# Patient Record
Sex: FEMALE | Race: WHITE
[De-identification: names, ages, dates, MRNs, and addresses within clinical notes are randomized per-mention and may not be internally consistent; named-entity substitution may affect disease eponyms.]

---

## 2019-09-24 ENCOUNTER — HOSPITAL ENCOUNTER (OUTPATIENT)
Dept: HOSPITAL 84 - D.CATH | Age: 74
Discharge: HOME | End: 2019-09-24
Attending: INTERNAL MEDICINE
Payer: MEDICARE

## 2019-09-24 VITALS — SYSTOLIC BLOOD PRESSURE: 168 MMHG | DIASTOLIC BLOOD PRESSURE: 72 MMHG

## 2019-09-24 VITALS — BODY MASS INDEX: 41.01 KG/M2 | WEIGHT: 270.57 LBS | HEIGHT: 68 IN | BODY MASS INDEX: 41.01 KG/M2

## 2019-09-24 DIAGNOSIS — I25.119: Primary | ICD-10-CM

## 2019-09-24 DIAGNOSIS — R94.30: ICD-10-CM

## 2019-09-24 LAB
ALT SERPL-CCNC: 24 U/L (ref 10–68)
ANION GAP SERPL CALC-SCNC: 16.8 MMOL/L (ref 8–16)
BASOPHILS NFR BLD AUTO: 0.3 % (ref 0–2)
BUN SERPL-MCNC: 24 MG/DL (ref 7–18)
CALCIUM SERPL-MCNC: 9.3 MG/DL (ref 8.5–10.1)
CHLORIDE SERPL-SCNC: 109 MMOL/L (ref 98–107)
CHOLEST/HDLC SERPL: 4.7 RATIO (ref 2.3–4.1)
CO2 SERPL-SCNC: 20.7 MMOL/L (ref 21–32)
CREAT SERPL-MCNC: 0.8 MG/DL (ref 0.6–1.3)
EOSINOPHIL NFR BLD: 3.1 % (ref 0–7)
ERYTHROCYTE [DISTWIDTH] IN BLOOD BY AUTOMATED COUNT: 14.6 % (ref 11.5–14.5)
GLUCOSE SERPL-MCNC: 117 MG/DL (ref 74–106)
HCT VFR BLD CALC: 41 % (ref 36–48)
HDLC SERPL-MCNC: 48 MG/DL (ref 32–96)
HGB BLD-MCNC: 13.7 G/DL (ref 12–16)
IMM GRANULOCYTES NFR BLD: 0.3 % (ref 0–5)
LDL-HDL RATIO: 3.3 RATIO (ref 1.5–3.5)
LDLC SERPL-MCNC: 157 MG/DL (ref 0–100)
LYMPHOCYTES NFR BLD AUTO: 37.5 % (ref 15–50)
MCH RBC QN AUTO: 29.3 PG (ref 26–34)
MCHC RBC AUTO-ENTMCNC: 33.4 G/DL (ref 31–37)
MCV RBC: 87.8 FL (ref 80–100)
MONOCYTES NFR BLD: 5.6 % (ref 2–11)
NEUTROPHILS NFR BLD AUTO: 53.2 % (ref 40–80)
OSMOLALITY SERPL CALC.SUM OF ELEC: 287 MOSM/KG (ref 275–300)
PLATELET # BLD: 188 10X3/UL (ref 130–400)
PMV BLD AUTO: 10.7 FL (ref 7.4–10.4)
POTASSIUM SERPL-SCNC: 4.5 MMOL/L (ref 3.5–5.1)
RBC # BLD AUTO: 4.67 10X6/UL (ref 4–5.4)
SODIUM SERPL-SCNC: 142 MMOL/L (ref 136–145)
TRIGL SERPL-MCNC: 112 MG/DL (ref 30–200)
WBC # BLD AUTO: 6.4 10X3/UL (ref 4.8–10.8)

## 2019-09-24 PROCEDURE — 93458 L HRT ARTERY/VENTRICLE ANGIO: CPT

## 2019-09-24 PROCEDURE — 93571 IV DOP VEL&/PRESS C FLO 1ST: CPT

## 2019-09-24 NOTE — NUR
PT HEAD OF BED AT 45 DEGREES. SET UP WITH SANDWICH TRAY AND DRINK.
DENIES NAUSEA. RIGHT WRIST Z BAND IN PLACE. NO NEW BLEEDING NOTED.

## 2019-09-24 NOTE — NUR
RIGHT WRIST Z BAND IN PLACE. NO BLEEDING AND HEMATOMA IS STABLE. VSS.
CAP REFILL TO RIGHT HAND < 3 SECS. TOLERATING WELL.

## 2019-09-24 NOTE — NUR
RIGHT WRIST Z BAND IN PLACE. NO BLEEDING NOTED. HEMATOMA GETTING
SOFTER. VSS. RESTING COMFORTABLY AT THIS TIME.

## 2019-09-24 NOTE — NUR
4cc OF AIR REMOVED FROM Z BAND. NO NEW BLEEDING NOTED. TOLERATING
WELL. VSS. FAMILY AT BEDSIDE. CALL LIGHT WITHIN REACH.

## 2019-09-24 NOTE — NUR
3cc OF AIR REMOVED FROM Z BAND. TOLERATED WELL. NO NEW BLEEDING
NOTED. PIV D/C'D WITH CATH TIP INTACT. TOLERATED WELL. VSS. PT
INSTRUCTED TO GET UP AND DRESSED. FAMILY AT BEDSIDE TO ASSIST.

## 2019-09-24 NOTE — NUR
PT TAKEN OUT TO VEHICLE BY WHEELCHAIR. NO S/S OF DISTRESS NOTED. ALL
BELONGINGS AND PAPERWORK IN HAND.

## 2019-09-24 NOTE — OP
PATIENT NAME:  JOSH GARCIA                           MEDICAL RECORD: [id number]
:41                                             LOCATION:D.CAT          
                                                         ADMISSION DATE:        
SURGEON:  SHARMIN VILLASENOR MD             
 
 
DATE OF OPERATION:  2019
 
PROCEDURES:
1.  PTCA stent LAD.
2.  IFR.
3.  Left heart catheterization.
4.  Selective coronary angiography.
5.  Left ventriculogram.
 
INDICATION:  Angina, coronary artery disease, abnormal nuclear stress test.
 
PROCEDURE IN DETAIL:  After informed consent was obtained and after a detailed
description of the risks, benefits as well as alternative therapies, the patient
elected to proceed with angiogram and angioplasty.  The right radial area was
prepped and draped in normal sterile fashion.  Right radial artery was
cannulated via modified Seldinger technique with placement of 6-Polish sheath. 
All catheters exchanged through this sheath.
 
FINDINGS:  Left ventriculogram was performed in standard 30-degree CASAS view,
reveals good cardiac wall motion throughout all segments.  Overall ejection
fraction estimated at 65%.
 
SELECTIVE CORONARY ANGIOGRAPHY:
1.  Left main has no significant angiographic disease.
2.  Left anterior descending has 80% stenosis in the mid vessel.  IFR was
abnormal at 0.62.  This correlates as well with the defect on nuclear stress
testing.
3.  Left circumflex has moderate irregularities, but no flow-limiting stenosis.
4.  Right coronary has moderate irregularities, but no flow-limiting stenosis.
 
PTCA STENT OF THE LAD:  The stent used was 2.0 x 8 mm [place] taken to 15
atmospheres.  Result was 0% residual stenosis.
 
OVERALL IMPRESSION:  Successful percutaneous transluminal coronary angioplasty
stent of the left anterior descending going from 80% initial stenosis to 0%
residual.
 
TRANSINT:[id number] Voice Confirmation ID: [id number] DOCUMENT ID: [id number]
                                           
                                           SHARMIN VILLASENOR MD             
 
 
 
Electronically Signed by SHARMIN VILLASENOR on 19 at 1338
CC:                                                             [id number]
DICTATION DATE: 19 1112     :     19 1152      REG [place] [id number] [place] [id number]

## 2019-09-24 NOTE — HEMODYNAMI
PATIENT:JOSH GARCIA                                  MEDICAL RECORD: [id number]
: 41                                            LOCATION:D.CAT          
ACCT# [id number]                                       ADMISSION DATE: 19
 
 
 Generatedon:201911:20
Patient name: JOSH GARCIA Patient #: [id number] Visit #: [id number] SSN: YOB: 1945 Date
of study: 2019
Page: Of
Hemodynamic Procedure Report
****************************
Patient Data
Patient Demographics
Procedure consent was obtained
First Name: JOSH       Gender: Female
Last Name: RADHA            : 1945
Patient #: [id number]       Age: 73 year(s)
Visit #: [id number]       Race: Unknown
Accession #:
[id number]
Additional ID: [id number]
Contact details
Address: [address] [place] [address]        Phone: [phone]
State: [place]
City: [place]
Zip code: [address]
Admission
Admission Data
Admission Date: 2019   Admission Time: 7:38
Admit Source: Other
Height (in.): 68            BSA: 2.33 (m2)
Height (cm.): 172.72        BMI: 41.23 (kg/m2)
Weight (lbs.): 271.17
Weight (kg.): 123
Lab Results
Lab Result Date: 2019  Lab Result Time: 0:00
Biochemistry
Name         Units    Result                Min      Max
BUN          mg/dl    24       --(----)-*   7        18
Creatinine   mg/dl    0.8      --(-*--)--   0.6      1.3
eGFR         ml/min   73.35838 *-(----)--   90       120
NONAFRICAN
CBC
Name         Units    Result                Min      Max
Hemoglobin   g/dl     13.7     --(*---)--   13.5     17.5
Procedure
Procedure Types
Cath Procedure
Diagnostic Procedure
[facility] w/Coronaries
FFR/IVUS
FFR Initial
Sedation Charges
Moderate Sedation up to 30 minutes
PCI Procedure
Coronary Stent
Coronary Stent Initial
 
Procedure Description
Procedure Date
Procedure Date: 2019
Procedure Start Time: 10:51
Procedure End Time: 11:16
Procedure Staff
Name                            Function
Peter Dunbar MD                Performing Physician
Edin Thomas RT                  Monitor
Eliezer Sheehan RN              Nurse
Abena Ledezma RT                Scrub
Procedure Data
Cath Procedure
Fluoroscopy
Diagnostic fluoroscopy      Total fluoroscopy Time: 4.1
time: 4.1 min               min
Diagnostic fluoroscopy      Total fluoroscopy dose:
dose: 1191 mGy              1191 mGy
Contrast Material
Contrast Material Type                       Amount (ml)
Isovue 300                                   131
Entry Location
Entry     Primary  Successful  Side  Size  Upsize Upsize Entry    Closure     Bah
ccessful  Closure
Location                             (Fr)  1 (Fr) 2 (Fr) Remarks  Device        
          Remarks
Radial                         Right 6 Fr                         Mechanical
artery                               Short                        Compression
Estimated blood loss: 10 ml
Diagnostic catheters
Device Type               Used For           End Catheter
Placement
DIAGNOSTIC [place] 110cm 5  Procedure
Fr catheter (377280)
Procedure Complications
No complications
Procedure Medications
Medication           Administration Route Dosage
0.9% NaCl            I.V.                 100 ml/hr
Oxygen               etCO2 Nasal cannula  2 l/min
Heparin Flush Bag    added to field       2 bags
(1000units/500ml NS)
Lidocaine 2%         added to field       20
Radial Cocktail      added to field       1 syringe
(Verapamil 2mg/Nitro
400mcg/Heparin
1500units)
Versed               I.V.                 2 mg
Fentanyl             I.V.                 100 mcg
Versed               I.V.                 1 mg
Radial Cocktail      I.A.                 1 syringe
(Verapamil 2mg/Nitro
400mcg/Heparin
1500units)
Heparin Bolus        I.V.                 4000 units
Integrilin (Bolus    I.V.                 11.3 ml
2mg/ml)
Integrilin (Bolus    wasted               8.7 ml
2mg/ml)
Plavix               P.O.                 600 mg
 
Hemodynamics
Rest
BSA: 2.33 (m2) HGB: 13.7 (g/dl) O2 Consumption: Estimated: 223.94 (ml/min) O2 Co
nsumption indexed:
Estimated:96.11 (ml/min/m) Heart Rate: 84 (bpm)
Pressure Samples
Time     Site     Value (mmHg) Purpose      Heart      Use
Rate(bpm)
10:54    LV       78/42,70     Snapshot     111
10:57    AO       173/67(98)   Snapshot     96
11:02    AO       175/73(116)  Snapshot     97
Snapshots
Pre Cath      Intra         NCS           Post Cath
Vital Signs
Time     Heart  Resp   SPO2 etCO2   NIBP (mmHg)  Rhythm  Pain    Sedation
Rate   (ipm)  (%)  (mmHg)                       Status  Level
(bpm)
10:32:52 81     28     94   0       181/92(144)  NSR     0 (11)  10(A)
, No
pain
10:37:13 81     21     93   26.9    171/84(128)  NSR     0 (11)  10(A)
, No
pain
10:41:39 85     23     96   35.2    182/89(153)  NSR     0 (11)  10(A)
, No
pain
10:46:03 81     12     91   30.7    150/82(121)  NSR     0 (11)  10(A)
, No
pain
10:50:21 82     21     92   11.2    148/78(101)  NSR     0 (11)  10(A)
, No
pain
10:59:22 72     20     93   34.5    198/108(146) NSR     0 (11)  10(A)
, No
pain
11:05:08 95     16     95   33      204/110(143) NSR     0 (11)  10(A)
, No
pain
11:09:46 95     25     96   33      216/106(154) NSR     0 (11)  10(A)
, No
pain
11:14:29 90     17     95   31.5    216/105(155) NSR     0 (11)  10(A)
, No
pain
Medications
Time     Medication       Route   Dose    Verified Delivered Reason          Not
es  Effectiveness
by       by
10:34:12 0.9% NaCl        I.V.    100     Eliezer  Eliezer   Per physician
ml/hr   Aguila Sheehan
RN       RN
10:34:23 Oxygen           etCO2   2 l/min Eliezer  Eliezer   for low 02 sats
Nasal           Lorigan  Aguila
cannula         RN       RN
10:34:33 Heparin Flush    added   2 bags  Eliezer  Eliezer   used for
Bag              to              Lorigan  Lorigan   procedure
(1000units/500ml field           RN       RN
NS)
10:34:44 Lidocaine 2%     added   20ml    Eliezer  Eliezer   for local
to      vial    Lorigan  Lorigan   anesthetic
 
           RN       RN
10:34:54 Radial Cocktail  added   1       Eliezer  Eliezer   used for
(Verapamil       to      syringe Lorigan  Lorigan   procedure
2mg/Nitro        field           RN       RN
400mcg/Heparin
1500units)
10:42:15 Versed           I.V.    2 mg    Eliezer  Eliezer   for sedation
Aguila Sheehan
RN       RN
10:42:22 Fentanyl         I.V.    100 mcg Eliezer  Eliezer   for sedation
Aguila Sheehan
RN       RN
10:45:40 Versed           I.V.    1 mg    Eliezer  Eliezer   for sedation
Aguila Sheehan
RN       RN
10:53:32 Radial Cocktail  I.A.    1       Eliezer  Peter   for
(Verapamil               syringe Aguila Dunbar MD  vasodilation
2mg/Nitro                        RN
400mcg/Heparin
1500units)
11:06:32 Heparin Bolus    I.V.    4000    Eliezer  Eliezer   for
units   Aguila Sheehan   anticoagulation
RN       RN
11:06:50 Integrilin       I.V.    11.3ml  Eliezer  Eliezer   for
(Bolus 2mg/ml)                   Aguila Sheehan   antiplatelet
RN       RN        therapy
11:07:36 Integrilin       wasted  8.7ml   Eliezer  Eliezer   to sharp's
(Bolus 2mg/ml)                   Aguila Sheehan
RN       RN
11:15:55 Plavix           P.O.    600 mg  Eliezer  Eliezer   for
[place] Sheehan   antiplatelet
RN       RN        therapy
Procedure Log
Time     Note
10:00:30 Procedure Status Elective Heart Cath (OP).
10:03:23 **ACC** Patient presents with Stable Angina CCS
Anginal Class 1--Ordinary physical activity does not
cause angina, angina occurs with strenuos, rapid, or
prolonged activity..
10:03:25 Eliezer Sheehan RN sent for patient. Start room use.
10:03:26 Time tracking: Regular hours (M-F 7:00 - 5:00)
10:03:30 Plan of Care:Hemodynamics will remain stable., Cardiac
rhythm will remain stable., Comfort level will be
maintained., Respiratory function will remain
adequate., Patient/ family verbilizes understanding of
procedure., Procedure tolerated without complication.,
Recovers from procedure without complications..
10:03:52 **ACC**Patient has been prescribed/administered the
following anti-anginal medication within the last 2
weeks: None
10:12:56 Admit Source: Other
10:13:00 Patient Height : 68 inches
10:13:06 Patient Weight : 271.17 lbs
10:14:59 Lab Result : Hemoglobin 13.7 g/dl
10:14:59 Lab Result : eGFR NONAFRICAN 73.23104 ml/min
10:14:59 Lab Result : BUN 24 mg/dl
10:14:59 Lab Result : Creatinine 0.8 mg/dl
10:23:40 Patient received from Pre/Post Procedure Room to CCL 1
Alert and oriented. Tansferred to table in Supine
position.
 
10:23:41 Warm blankets applied, and berkley hugger turned on for
patient comfort.
10:23:42 Signed procedure consent form obtained from patient.
10:23:43 Correct patient and procedure confirmed by team.
10:23:43 ECG and BP/O2 sat monitors applied to patient.
10:31:39 Vital chart was started
10:31:40 Baseline sample Acquired.
10:31:44 Rhythm: sinus rhythm
10::46 Full Disclosure recording started
10:31:51 H&P Date Dictated: 2019 Within 30 days and on
chart., H&P Addendum completed by physician on day of
procedure. (MUST COMPLETE FOR ALL OUTPATIENTS).
10:32:59 Pre-procedure instructions explained to patient.
10:33:00 Pre-op teaching completed and patient verbalized
understanding.
10:33:02 Family in patients room.
10:33:04 Patient NPO since Midnight.
10:33:05 Is the patient allergic to Iodine/contrast media? No.
10:33:08 Is patient on blood thinner?Yes
10:33:11 **ACC** The patient was administered the following
blood thiners within the last 24 hours: None
10:33:13 Patient diabetic? No.
10:33:15 Patient not pregnant. Patient is over age 55.
10:33:17 Previous problem with sedation/anesthesia? No ?
10:33:18 Snore? Yes
10:33:19 Sleep apnea? No
10:33:25 Deviated septum? No
10:33:25 Opens mouth fully? Yes
10:33:26 Sticks out tongue? Yes
10:33:38 Airway obstruction? Yes CHRONIC BRONCHITIS
10:33:43 Dentures? Yes PARTIAL IN
10:33:46 Pre procedure: right dorsailis pedis pulse 1+
Palpable, but thready & weak; easily obliterated
10:33:48 Modified Herman's test Ulnar < 7 seconds
10:33:50 Patient pain scale 0/10 ?.
10:34:00 IV patent on arrival in left forearm with 0.9% NaCl at
KVO.
10:34:02 Lab results completed and on chart.
10:34:05 Right Radial & Right Groin area was prepped with
chlora-prep and draped in sterile fashion
10:34:07 Alarms reviewed by R. N.
10:34:07 Sharps counted by scrub and verified by R.N.
10:34:11 Use device set Radial Dx or PCI
10:34:12 0.9% NaCl 100 ml/hr I.V. was administered by Eliezer
Lorigan RN; Per physician;
10:34:13 Tegaderm 4 x 4 (1626W) opened to sterile field.
10:34:13 ACIST Manifold (00547) opened to sterile field.
10:34:14 ACIST Hand Control (32040) opened to sterile field.
10:34:15 ACIST Syringe (19603) opened to sterile field.
10:34:16 Medline Cath Pack ([id number]) opened to sterile field.
10:34:16 Bag Decanter (2002S) opened to sterile field.
10:34:17 MBrace Wrist Support ([id number]) opened to sterile
field.
10:34:19 EMERALD Guide Wire (314-419) opened to sterile field.
10:34:22 SHEATH 6FR RAIN (0365493) opened to sterile field.
10:34:23 Oxygen 2 l/min etCO2 Nasal cannula was administered by
Eliezer Sheehan RN; for low 02 sats;
10:34:33 Heparin Flush Bag (1000units/500ml NS) 2 bags added to
field was administered by Eliezer Sheehan RN; used for
procedure;
 
10:34:44 Lidocaine 2% 20ml vial added to field was administered
by Eliezer Sheehan RN; for local anesthetic;
10:34:54 Radial Cocktail (Verapamil 2mg/Nitro 400mcg/Heparin
1500units) 1 syringe added to field was administered
by Eliezer Sheehan RN; used for procedure;
10:41:21 --------ALL STOP TIME OUT------
10:41:21 Final Timeout: patient, procedure, and site verified
with staff and physician. All members of the team are
in agreement.
10:41:24 Right Radial & Right Groin site verified by team.
10:41:29 Fire Safety Assessment: A--An alcohol-based skin
anteseptic being used preoperatively., C--Open oxygen
or nitrous oxide is being used., D--An ESU, laser, or
fiber-optic light is being used.
10:41:32 Physical assessment completed. ASA score P 2 - A
patient with mild systemic disease as per Peter Dunbar MD.
10:41:37 2) 60-89 Mildly reduced kidney function, and other
findings (as for stage 1) point to kidney disease.
10:41:41 Maximum allowable contrast dose (3.7 X eGFR X 0.75)203
ml.
10:41:44 Sedation plan: IV Moderate Sedation Medication:Versed,
Fentanyl
10:42:15 Versed 2 mg I.V. was administered by Eliezer Sheehan
RN; for sedation;
10:42:22 Fentanyl 100 mcg I.V. was administered by Eliezer Sheehan RN; for sedation;
10:45:40 Versed 1 mg I.V. was administered by Eliezer Sheehan
RN; for sedation;
10:51:33 Procedure started.
10:51:38 Local anesthetic to right radial artery with Lidocaine
2% by Peter Dunbar MD.**INITIAL ACCESS ONLY**
10:53:11 A 6 Fr Short sheath was inserted into the Right Radial
artery
10:53:27 A DIAGNOSTIC Tiger 110cm 5 Fr catheter (109601) was
advanced over the wire and used for Procedure.
10:53:32 Radial Cocktail (Verapamil 2mg/Nitro 400mcg/Heparin
1500units) 1 syringe I.A. was administered by Peter Dunbar MD; for vasodilation;
10:54:43 LV angiography performed.
10:54:44 LV gram done using CASAS
10:54:50 EF : 65 %
10:54:55 Injector settings: Ml/sec: 5, Volume: 15,
10:55:10 RCA angiography performed.
10:56:19 Catheter exchanged over wire.
10:57:00 GUIDE 6FR EBU 3.5 catheter ([id number]) opened to
sterile field.
10:57:11 6 Fr EBU 3.5 guide catheter was inserted over the wire
10:57:38 LCA angiography performed.
10:57:58 Use device set [facility] PCI
10:58:00 **ACC**Dominant side:Right
11:01:41 [place] Verrata Plus pressure wire (79772A) opened to
sterile field.
11:01:44 INFLATOR Merit BasixCompak (AF0340) opened to sterile
field.
11:02:33 FFR/IFR wire advanced.
11:03:24 Wire advanced across lesion.
11:04:39 mLAD lesion measured at 0.62 with IFR
11:05:45 **ACC** Pre-intervention NEREIDA Flow is 3.
11:05:52 Pre PCI Site: Native mLAD has 80% stenosis.
 
11:06:32 Heparin Bolus 4000 units I.V. was administered by
Eliezer Sheehan RN; for anticoagulation;
11:06:50 Integrilin (Bolus 2mg/ml) 11.3ml I.V. was administered
by Eliezer Sheehan RN; for antiplatelet therapy;
11:07:20 Place stent Inflation Number: 1 A LINDEN RX 2.0 x 8
stent ([id number]) was prepped and advanced across
the Mid LAD 80. The stent was deployed at 15 TERESA for
0:10 (min:sec) 0.
11:07:36 Integrilin (Bolus 2mg/ml) 8.7ml wasted was
administered by Eliezer Sheehan RN; to sharp's;
11:07:53 **ACC** Post-intervention NEREIDA Flow is 3.
11:08:02 Post PCI Site: Native mLAD has 0% stenosis.
11:08:09 Stent catheter was removed intact over wire.
11:08:10 Wire removed.
11:08:10 Guide catheter removed.
11:09:31 ZEPHYR REGULAR TR BAND (033324) opened to sterile
field.
11:09:46 Sheath removed intact; hemostasis achieved with
Mechanical Compression to the Right Radial artery.
11:10:10 Procedure ended.(Physican Out)
11:11:08 Fluoroscopy time 04.10 minutes.
11:11:12 Fluoroscopy dose: 1191 mGy
11:11:12 Flurop Dose total: 1191
11:11:16 Dose Area Product 69896 mGy/cm.
11:11:19 Contrast amount:Isovue 300 131ml.
11:11:21 Maximum allowable dose exceeded? No.
11:11:22 Sharps counted by scrub and verified by R.N.
11:11:24 Insertion/operative site no bleeding no hematoma.
11:11:31 Post Procedure Pulses reassessed and unchanged
11:11:34 [place] band inflated with 10cc of air.
11:11:37 Post-procedure physical assessment completed. ASA
score P 2 - A patient with mild systemic disease as
per Peter Dunbar MD.
11:11:40 Post procedure rhythm: unchanged.
11:11:43 Estimated blood loss: 10 ml
11:11:44 Post procedure instruction explained to
patient.Patient verbalizes understanding.
11:11:45 Patient needs reinforcement of post procedure
teaching.
11:14:05 Procedure type changed to Cath procedure, Diagnostic
procedure, LHC, [facility] w/Coronaries, FFR/IVUS, FFR
Initial, Sedation Charges, Moderate Sedation up to 30
minutes, PCI procedure, Coronary Stent, Coronary Stent
Initial
11:14:14 Procedure and supply charges have been captured,
reviewed, submitted and are correct.
11:14:17 Procedure Complication : No complications
11:15:55 Plavix 600 mg P.O. was administered by Eliezer Sheehan RN; for antiplatelet therapy;
11:16:38 Vital chart was stopped
11:16:38 See physician's report for complete and final results.
11:16:42 Report given to Pre/Post Procedure Room.
11:16:49 Patient transfered to Pre/Post Procedure Room with
Stretcher.
11:16:51 Procedure ended.
11:16:51 Full Disclosure recording stopped
11:18:16 ACT drawn and resulted at 275 seconds. (normal
therapeutic range 180-240 seconds).
11:19:12 End room use (Document Last)
11:19:24 End room use (Document Last)
 
Intervention Summary
Intervention Notes
Time     ActionType  Lesion and  Equipment Used Action#  Pressure  Duration
Attributes
11:07:20 Place stent Mid LAD     LINDEN RX 2.0 x  1        15        00:10
8 stent
([id number])
Device Usage
Item Name      Manufacture  Quantity  Catalog       Hospital Part       Current 
Minimal Lot# /
Number        Charge   Number     Stock   Stock   Serial#
Code
Tegaderm 4 x 4 3M           1         1626W         [id number]   619957     424676  
5
(1626W)
ACIST Manifold Acist        1         11207         289052   261632     911146  
5
(64939)        Medical
Systems Inc
ACIST Hand     Acist        1         72377         120182   000965     602276  
5
Control        Medical
(80166)        Systems Inc
ACIST Syringe  Acist        1         65674         991720   294501     980530  
20
(07466)        Medical
Systems Inc
Medline Cath   Medline      1         [id number]     360528   19807      693369  
5
Pack
([id number])
Bag Decanter   Microtek     1         S         801586   54116      446076  
5
(S)        Medical Inc.
MBrace Wrist   Advanced     1         [id number]   144102   22945      886597  
5
Support        Vascular
([id number])    Dynamics
EMERALD Guide  Cardinal     1         217-915       273386   589785     058830  
5
Wire (248-016) Health
SHEATH 6FR     Cardinal     1         3248515       189387   0872758    935578  
5
RAIN (3006593) Health
DIAGNOSTIC     Terumo       1                884079   605650     149911  
5
Tiger 110cm 5
Fr catheter
(190821)
GUIDE 6FR EBU  Medtronic    1         [id number]      318165   33337      482709  
3
3.5 catheter
([id number])
Volcano        [place]      1         77661T        972049   298523714  956023  
5
Verrata Plus
pressure wire
(51066M)
INFLATOR Merit Merit        1         MS1875        454891   377816     988274  
15
 
[employer]    Medical
(WK5210)
LINDEN RX 2.0 x  Medtronic    1         [id number]  432013   4871427    172176  
5       [id number]
8 stent
([id number])
ZEPHYR REGULAR Cardinal     1         241039        437934   0032521    982424  
5
[facility]        [employer]
(556648)
Signature Audit [place]
Stage           Time        Signature      Unsigned
Intra-Procedure 2019   Eliezer
11:14:05 AM Aguila HAAS
Intra-Procedure 2019   Edin Thomas
11:19:24 AM RT(R)
Intra-Procedure 2019   Peter Dunbar
11:20:11 AM MD
 
 
 
 
 
 
 
 
 
 
 
 
 
 
 
 
 
 
 
 
 
 
 
 
 
 
 
 
 
 
 
 
 
 
 
 
 
[facility]                                          
[address] [place] [address]

## 2019-09-24 NOTE — NUR
PT TO RESTROOM. VOIDED WITHOUT DIFFICULTY. BACK TO ROOM. DISCUSSED
DISCHARGE INSTRUCTIONS WITH PT AND PT'S FAMILY. THEY VOICED
UNDERSTANDING. RIGHT WRIST BRUISING NOTED, BUT NO NEW
SWELLING/BRUISING. DRESSING C/D/I. RIGHT WRIST BRACE IN PLACE.

## 2019-09-24 NOTE — NUR
RIGHT WRIST Z BAND IN PLACE. NO NEW BLEEDING NOTED. CAP REFILL TO
RIGHT FINGERS < 3 SECS. WARM TO TOUCH. VSS. PT RESTING COMFORTABLY AT
THIS TIME.

## 2019-09-24 NOTE — NUR
2cc OF AIR REMOVED FROM Z BAND. TOLERATING WELL. BRUISING NOTED TO
RIGHT WRIST FROM HEMATOMA. NO NEW BLEEDING NOTED. VSS. FAMILY AT
BEDSIDE. PT TOLERATED FOOD AND DRINK. DENIES NAUSEA AND PAIN.

## 2020-03-16 ENCOUNTER — HOSPITAL ENCOUNTER (INPATIENT)
Dept: HOSPITAL 84 - D.ER | Age: 75
LOS: 4 days | Discharge: HOME | DRG: 177 | End: 2020-03-20
Attending: INTERNAL MEDICINE | Admitting: INTERNAL MEDICINE
Payer: MEDICARE

## 2020-03-16 VITALS
WEIGHT: 255.54 LBS | BODY MASS INDEX: 38.73 KG/M2 | BODY MASS INDEX: 38.73 KG/M2 | HEIGHT: 68 IN | WEIGHT: 255.54 LBS | HEIGHT: 68 IN | BODY MASS INDEX: 38.73 KG/M2

## 2020-03-16 VITALS — SYSTOLIC BLOOD PRESSURE: 116 MMHG | DIASTOLIC BLOOD PRESSURE: 46 MMHG

## 2020-03-16 VITALS — SYSTOLIC BLOOD PRESSURE: 128 MMHG | DIASTOLIC BLOOD PRESSURE: 50 MMHG

## 2020-03-16 VITALS — SYSTOLIC BLOOD PRESSURE: 179 MMHG | DIASTOLIC BLOOD PRESSURE: 68 MMHG

## 2020-03-16 VITALS — SYSTOLIC BLOOD PRESSURE: 147 MMHG | DIASTOLIC BLOOD PRESSURE: 73 MMHG

## 2020-03-16 VITALS — SYSTOLIC BLOOD PRESSURE: 107 MMHG | DIASTOLIC BLOOD PRESSURE: 65 MMHG

## 2020-03-16 VITALS — DIASTOLIC BLOOD PRESSURE: 40 MMHG | SYSTOLIC BLOOD PRESSURE: 117 MMHG

## 2020-03-16 VITALS — SYSTOLIC BLOOD PRESSURE: 126 MMHG | DIASTOLIC BLOOD PRESSURE: 49 MMHG

## 2020-03-16 VITALS — SYSTOLIC BLOOD PRESSURE: 120 MMHG | DIASTOLIC BLOOD PRESSURE: 48 MMHG

## 2020-03-16 VITALS — SYSTOLIC BLOOD PRESSURE: 139 MMHG | DIASTOLIC BLOOD PRESSURE: 51 MMHG

## 2020-03-16 DIAGNOSIS — J96.01: ICD-10-CM

## 2020-03-16 DIAGNOSIS — J44.1: ICD-10-CM

## 2020-03-16 DIAGNOSIS — I50.9: ICD-10-CM

## 2020-03-16 DIAGNOSIS — I48.91: ICD-10-CM

## 2020-03-16 DIAGNOSIS — E03.9: ICD-10-CM

## 2020-03-16 DIAGNOSIS — I24.8: ICD-10-CM

## 2020-03-16 DIAGNOSIS — I11.0: ICD-10-CM

## 2020-03-16 DIAGNOSIS — E87.0: ICD-10-CM

## 2020-03-16 DIAGNOSIS — K21.9: ICD-10-CM

## 2020-03-16 DIAGNOSIS — J69.0: Primary | ICD-10-CM

## 2020-03-16 LAB
ALBUMIN SERPL-MCNC: 3.4 G/DL (ref 3.4–5)
ALP SERPL-CCNC: 72 U/L (ref 30–120)
ALT SERPL-CCNC: 58 U/L (ref 10–68)
ANION GAP SERPL CALC-SCNC: 16.7 MMOL/L (ref 8–16)
APTT BLD: 32.5 SECONDS (ref 22.8–39.4)
BACTERIA #/AREA URNS HPF: (no result) /HPF
BASOPHILS NFR BLD AUTO: 0.2 % (ref 0–2)
BILIRUB SERPL-MCNC: 0.75 MG/DL (ref 0.2–1.3)
BILIRUB SERPL-MCNC: NEGATIVE MG/DL
BUN SERPL-MCNC: 26 MG/DL (ref 7–18)
CALCIUM SERPL-MCNC: 8.4 MG/DL (ref 8.5–10.1)
CHLORIDE SERPL-SCNC: 111 MMOL/L (ref 98–107)
CK MB SERPL-MCNC: 1.3 U/L (ref 0–3.6)
CK SERPL-CCNC: 49 UL (ref 21–215)
CO2 SERPL-SCNC: 22.6 MMOL/L (ref 21–32)
CREAT SERPL-MCNC: 1.3 MG/DL (ref 0.6–1.3)
EOSINOPHIL NFR BLD: 0.5 % (ref 0–7)
ERYTHROCYTE [DISTWIDTH] IN BLOOD BY AUTOMATED COUNT: 15.3 % (ref 11.5–14.5)
GLOBULIN SER-MCNC: 3.5 G/L
GLUCOSE SERPL-MCNC: 151 MG/DL (ref 74–106)
GLUCOSE SERPL-MCNC: NEGATIVE MG/DL
HCT VFR BLD CALC: 38.5 % (ref 36–48)
HGB BLD-MCNC: 12 G/DL (ref 12–16)
IMM GRANULOCYTES NFR BLD: 0.7 % (ref 0–5)
INR PPP: 1.3 (ref 0.85–1.17)
KETONES UR STRIP-MCNC: NEGATIVE MG/DL
LYMPHOCYTES NFR BLD AUTO: 13.1 % (ref 15–50)
MCH RBC QN AUTO: 29 PG (ref 26–34)
MCHC RBC AUTO-ENTMCNC: 31.2 G/DL (ref 31–37)
MCV RBC: 93 FL (ref 80–100)
MONOCYTES NFR BLD: 6 % (ref 2–11)
NEUTROPHILS NFR BLD AUTO: 79.5 % (ref 40–80)
NITRITE UR-MCNC: NEGATIVE MG/ML
OSMOLALITY SERPL CALC.SUM OF ELEC: 298 MOSM/KG (ref 275–300)
PH UR STRIP: 5 [PH] (ref 5–6)
PLATELET # BLD: 190 10X3/UL (ref 130–400)
PMV BLD AUTO: 10.8 FL (ref 7.4–10.4)
POTASSIUM SERPL-SCNC: 4.3 MMOL/L (ref 3.5–5.1)
PROT SERPL-MCNC: 6.9 G/DL (ref 6.4–8.2)
PROTHROMBIN TIME: 16.1 SECONDS (ref 11.6–15)
RBC # BLD AUTO: 4.14 10X6/UL (ref 4–5.4)
RBC #/AREA URNS HPF: (no result) /HPF (ref 0–5)
SODIUM SERPL-SCNC: 146 MMOL/L (ref 136–145)
SP GR UR STRIP: 1.01 (ref 1–1.02)
SQUAMOUS #/AREA URNS HPF: (no result) /HPF (ref 0–5)
TROPONIN I SERPL-MCNC: 0.1 NG/ML (ref 0–0.06)
UROBILINOGEN UR-MCNC: NORMAL MG/DL
WBC # BLD AUTO: 10.9 10X3/UL (ref 4.8–10.8)
WBC #/AREA URNS HPF: (no result) /HPF

## 2020-03-16 NOTE — NUR
REQUESTED IV BE MOVED FROM RT AC. 20GA STARTED TO LT HAND X1 ATTEMPT BY
JAVIER HAAS. ATTEMPTS X3 BY THIS NURSE UNSUCESSFUL.

## 2020-03-16 NOTE — NUR
ASSUMED PT CARE AT THIS TIME. PT AAOX3. TEMP 99.4. PT PLACED ON BIPAP. PT
DENIES ANY FURTHER NEEDS AT THIS TIME. WILL CPOC. CL WITHIN REACH.

## 2020-03-16 NOTE — NUR
OT NOTE ADDENDUM: COMPLETED INITIAL EVAL ; RECOMMEND OT SERVICES TO IMPROVE
STRNEGTH, ENDURANCE, AND ADLS.
CAMI PAVON, OTR/L
[phone]

## 2020-03-16 NOTE — NUR
PT LYING IN BED. WATCHING TV. FAMILY MEMBER AT BEDSIDE. PT STATES SHE HAS NO
FURTHER NEEDS AT THIS TIME. EMPTIED 650ML OUT OF MORA BAG. BED LOW. CL IN
REACH. WILL COTNINUE TO MONITOR.

## 2020-03-16 NOTE — NUR
INDWELLING MORA CATHETER 16FR INSERTED WITH NO DIFFICULTY. STERILE FIELD
MAINTAINED. STAT LOCK SECURED TO RIGHT INNER THIGH. 150CC CLEAR, YELLOW URINE
AT INSERTING. PT TOLERATED WELL. WILL CONTINUE TO MONITOR.

## 2020-03-16 NOTE — NUR
RECEIVED UP IN BED WITH EYES OPEN. ALERT AND ORIENTED X4. UP WITH ASSIST. PT
STATES SHE IS ABLE TO AMBULATE WITH OUT ASSIST. IV TO RT AC WITH NS AT TKO.
PUMP GOING OFF OFTEN. REQUEST IV BE MOVED. NO EDEMA TO LOWER EXTREMITIES. F/C
PATENT WITH CLEAR YELLOW URINE DRAINING TO BEDSIDE DRAINAGE BAG. TELEMETRY IN
PLACE. O2@ 7 LITERS PER HF N/C. DENIES ANY NEEDS AT THIS TIME.

## 2020-03-17 VITALS — DIASTOLIC BLOOD PRESSURE: 56 MMHG | SYSTOLIC BLOOD PRESSURE: 135 MMHG

## 2020-03-17 VITALS — DIASTOLIC BLOOD PRESSURE: 64 MMHG | SYSTOLIC BLOOD PRESSURE: 128 MMHG

## 2020-03-17 VITALS — SYSTOLIC BLOOD PRESSURE: 126 MMHG | DIASTOLIC BLOOD PRESSURE: 62 MMHG

## 2020-03-17 VITALS — DIASTOLIC BLOOD PRESSURE: 87 MMHG | SYSTOLIC BLOOD PRESSURE: 119 MMHG

## 2020-03-17 VITALS — SYSTOLIC BLOOD PRESSURE: 127 MMHG | DIASTOLIC BLOOD PRESSURE: 55 MMHG

## 2020-03-17 LAB
ANION GAP SERPL CALC-SCNC: 12.6 MMOL/L (ref 8–16)
BASOPHILS NFR BLD AUTO: 0.3 % (ref 0–2)
BUN SERPL-MCNC: 28 MG/DL (ref 7–18)
CALCIUM SERPL-MCNC: 8.2 MG/DL (ref 8.5–10.1)
CHLORIDE SERPL-SCNC: 109 MMOL/L (ref 98–107)
CO2 SERPL-SCNC: 26.5 MMOL/L (ref 21–32)
CREAT SERPL-MCNC: 1.2 MG/DL (ref 0.6–1.3)
EOSINOPHIL NFR BLD: 2.2 % (ref 0–7)
ERYTHROCYTE [DISTWIDTH] IN BLOOD BY AUTOMATED COUNT: 15.2 % (ref 11.5–14.5)
GLUCOSE SERPL-MCNC: 114 MG/DL (ref 74–106)
HCT VFR BLD CALC: 36.6 % (ref 36–48)
HGB BLD-MCNC: 11.3 G/DL (ref 12–16)
IMM GRANULOCYTES NFR BLD: 0.2 % (ref 0–5)
LYMPHOCYTES NFR BLD AUTO: 21.4 % (ref 15–50)
MCH RBC QN AUTO: 28.8 PG (ref 26–34)
MCHC RBC AUTO-ENTMCNC: 30.9 G/DL (ref 31–37)
MCV RBC: 93.1 FL (ref 80–100)
MONOCYTES NFR BLD: 10.1 % (ref 2–11)
NEUTROPHILS NFR BLD AUTO: 65.8 % (ref 40–80)
OSMOLALITY SERPL CALC.SUM OF ELEC: 295 MOSM/KG (ref 275–300)
PLATELET # BLD: 200 10X3/UL (ref 130–400)
PMV BLD AUTO: 11 FL (ref 7.4–10.4)
POTASSIUM SERPL-SCNC: 3.1 MMOL/L (ref 3.5–5.1)
RBC # BLD AUTO: 3.93 10X6/UL (ref 4–5.4)
SODIUM SERPL-SCNC: 145 MMOL/L (ref 136–145)
WBC # BLD AUTO: 9.6 10X3/UL (ref 4.8–10.8)

## 2020-03-17 NOTE — NUR
OT NOTE: PT DOING BETTER TODAY. BED MOB WITH SBA; ABLE TO AMB TO AND FROM
BATHROOM WITH SBA AND NO AD; TOILETING AND HYGIENE WITH SPV. AMB INTO HALLWAY
TO IMPROVE FUNCTIONAL ENDURANCE X APPROX 100+ FT WITH USE OF 02. BACK TO BED
WITHOUT ASSIST.
CAMI PAVON, OTR/L
[phone]

## 2020-03-17 NOTE — NUR
RESTS IN BED WITH CALL LIGHT IN REACH.  RESP UL ON 02 4L NC.  MORA CATH
INTACT AND PATENT TO GRAVITY.  DAUGHTER AT BS.  WILL CONT. PLAN OF CARE.

## 2020-03-17 NOTE — NUR
REPORT RECEIVED, WILL CONT POC. PATIENT IS AAOX4, LYING IN SEMI-FOWLERS
POSITION. NO S/S OF DISTRESS OBSERVED, RR EVEN AND UNLABORED ON 6.5L HFNC. F/C
DRAINING DARK URINE BY GRAVITY TO RT SIDE OF BED. PIV TO RT AC SL AND LT HAND
SL. PATIENT DENIES NEEDS AT THIS TIME. CL IN REACH, BED LOCKED AND LOWERED.
WILL CTM.

## 2020-03-18 VITALS — DIASTOLIC BLOOD PRESSURE: 41 MMHG | SYSTOLIC BLOOD PRESSURE: 138 MMHG

## 2020-03-18 VITALS — SYSTOLIC BLOOD PRESSURE: 143 MMHG | DIASTOLIC BLOOD PRESSURE: 47 MMHG

## 2020-03-18 VITALS — DIASTOLIC BLOOD PRESSURE: 44 MMHG | SYSTOLIC BLOOD PRESSURE: 138 MMHG

## 2020-03-18 VITALS — DIASTOLIC BLOOD PRESSURE: 49 MMHG | SYSTOLIC BLOOD PRESSURE: 120 MMHG

## 2020-03-18 VITALS — DIASTOLIC BLOOD PRESSURE: 49 MMHG | SYSTOLIC BLOOD PRESSURE: 125 MMHG

## 2020-03-18 VITALS — SYSTOLIC BLOOD PRESSURE: 127 MMHG | DIASTOLIC BLOOD PRESSURE: 49 MMHG

## 2020-03-18 LAB
ANION GAP SERPL CALC-SCNC: 11.9 MMOL/L (ref 8–16)
BASOPHILS NFR BLD AUTO: 0.3 % (ref 0–2)
BUN SERPL-MCNC: 26 MG/DL (ref 7–18)
CALCIUM SERPL-MCNC: 8.7 MG/DL (ref 8.5–10.1)
CHLORIDE SERPL-SCNC: 109 MMOL/L (ref 98–107)
CO2 SERPL-SCNC: 28.7 MMOL/L (ref 21–32)
CREAT SERPL-MCNC: 1.2 MG/DL (ref 0.6–1.3)
EOSINOPHIL NFR BLD: 2.9 % (ref 0–7)
ERYTHROCYTE [DISTWIDTH] IN BLOOD BY AUTOMATED COUNT: 15.2 % (ref 11.5–14.5)
GLUCOSE SERPL-MCNC: 127 MG/DL (ref 74–106)
HCT VFR BLD CALC: 39.4 % (ref 36–48)
HGB BLD-MCNC: 12 G/DL (ref 12–16)
IMM GRANULOCYTES NFR BLD: 0.1 % (ref 0–5)
LYMPHOCYTES NFR BLD AUTO: 19.8 % (ref 15–50)
MCH RBC QN AUTO: 28.8 PG (ref 26–34)
MCHC RBC AUTO-ENTMCNC: 30.5 G/DL (ref 31–37)
MCV RBC: 94.7 FL (ref 80–100)
MONOCYTES NFR BLD: 8.7 % (ref 2–11)
NEUTROPHILS NFR BLD AUTO: 68.2 % (ref 40–80)
OSMOLALITY SERPL CALC.SUM OF ELEC: 297 MOSM/KG (ref 275–300)
PLATELET # BLD: 211 10X3/UL (ref 130–400)
PMV BLD AUTO: 11.1 FL (ref 7.4–10.4)
POTASSIUM SERPL-SCNC: 3.6 MMOL/L (ref 3.5–5.1)
RBC # BLD AUTO: 4.16 10X6/UL (ref 4–5.4)
SODIUM SERPL-SCNC: 146 MMOL/L (ref 136–145)
WBC # BLD AUTO: 9.7 10X3/UL (ref 4.8–10.8)

## 2020-03-18 NOTE — NUR
REPORT RECEIVED, WILL CONTINUE POC. PATIENT IS AAOX4, LYING IN SEMI-FOWLERS
POSITION. NO S/S OF DISTRESS OBSERVED, RR EVEN AND UNLABORED ON 3L O2 VIA NC.
PIV TO LT HAND, SL, FLAQUITO C/D/I. PATIENT DENIES NEEDS AT THIS TIME. CL IN
REACH, BED LOCKED AND LOWERED. WILL CTM.

## 2020-03-18 NOTE — NUR
AM ROUNDS COMPLETED. INTRODUCED MYSELF TO PT AS PRIMARY RN FOR TODAYS SHIFT.
PT IS A&O SITTING UP IN BED RESTING QUIETLY. PT STATES SHE SLEPT GOOD AND IS
FEELING GOOD OVERALL. PT STATES SHE FEELS LIKE SHE IS BREATHING BETTER ALSO.
PT DOESNT WEAR OXYGEN AT HOME SO I WILL TRY TO WEAN HER DOWN. SHIFT ASSESSMENT
COMPLETED. PT HAS MORA CATHETER, UNSURE OF REASON SO WILL CHECK TO SEE IF SHE
MEETS CRITERIA. PT STATES SHE WOULD PREFER NOT TO HAVE ONE. PT DENIES ANY
CURRENT PAIN OR NEEDS AT THIS TIME. NO FAMILY PRESENT. CL IN REACH, BED IN
LOWEST, SIDE RAILS X2. WILL CPOC.

## 2020-03-18 NOTE — NUR
OT NOTE: PT COMPLETED SIT TO STAND WITH SBA. PT COMPLETED ADL MOB WITH CGA. PT
COMPLETED KAMARI/DOFF SOCKS WITH SETUP. PT COMPLETED BUE AROM EXS AT EOB WITH
SPV. PT DID VERY WELL.
[phone]
THANK YOU,JIMBO GALLARDO

## 2020-03-18 NOTE — NUR
ASSISTED PT UP TO BR. PT VOIDED WITHOUT ANY DIFFICULTIES SINCE REMOVAL OF
MORA CATHETER. WILL CONTINUE STRICT INPUT AND OUTPUT MONITERING. PT RESTING
QUIETLY AND DENIES ANY CURRENT PAIN OR NEEDS AT THIS TIME. CL IN REACH, BED IN
LOWEST, SIDE RAILS X2. WILL CTM.

## 2020-03-18 NOTE — NUR
PT UP OOB AMBULATING AGAIN WITH THERAPY. PT STATES SHE IS FEELING MUCH BETTER
AND DENIES SOB UPON AMBULATING. PT WANTING TO SHOWER. WRAPPED L.HAND PIV AND
D/C R.AC PIV AS WE ARE NOT USING IT AND IT APPEARED SOILED. CATH TIP FULLY
INTACT. REMOVED PTS MORA CATHETER AS SHE DOES NOT MEET CRITERIA. CATHETER
BULB TIP FULLY INTACT. INSTRUCTED PT ON STRICT I&OS AND PROVIDED PT WITH A
TOPHAT TO MEASURE OUTPUT. PT VERBALIZED UNDERSTANDING AND STATES SHE DOESNT
HAVE A PROBLEM VOIDING. CHEY BARBOSA AT BEDSIDE AND ASSISTED PT WITH SHOWER. PT
BACK IN BED NOW AND WEANED DOWN TO 2LNC PULSE OX 97% NO FAMILY PRESENT. NO
CURRENT NEEDS. WILL CTM.

## 2020-03-18 NOTE — NUR
Nutrition Follow-up:
Eating well. Noted SLP signed off.
Diet: Cardiac
PO intake: %
Wt: 255# (3/18); 260# (3/16)
Last BM: 3/17 per chart
Labs noted: Na 146, Glu 127
Meds noted: Lasix, KDur, Protonix
-Continue current diet as tolerated.
-Monitor wt.
-RD following.

## 2020-03-19 VITALS — DIASTOLIC BLOOD PRESSURE: 59 MMHG | SYSTOLIC BLOOD PRESSURE: 145 MMHG

## 2020-03-19 VITALS — SYSTOLIC BLOOD PRESSURE: 117 MMHG | DIASTOLIC BLOOD PRESSURE: 50 MMHG

## 2020-03-19 VITALS — SYSTOLIC BLOOD PRESSURE: 130 MMHG | DIASTOLIC BLOOD PRESSURE: 55 MMHG

## 2020-03-19 VITALS — DIASTOLIC BLOOD PRESSURE: 66 MMHG | SYSTOLIC BLOOD PRESSURE: 143 MMHG

## 2020-03-19 VITALS — DIASTOLIC BLOOD PRESSURE: 55 MMHG | SYSTOLIC BLOOD PRESSURE: 130 MMHG

## 2020-03-19 VITALS — DIASTOLIC BLOOD PRESSURE: 47 MMHG | SYSTOLIC BLOOD PRESSURE: 138 MMHG

## 2020-03-19 VITALS — SYSTOLIC BLOOD PRESSURE: 130 MMHG | DIASTOLIC BLOOD PRESSURE: 70 MMHG

## 2020-03-19 LAB
ANION GAP SERPL CALC-SCNC: 10.2 MMOL/L (ref 8–16)
BASOPHILS NFR BLD AUTO: 0.2 % (ref 0–2)
BUN SERPL-MCNC: 31 MG/DL (ref 7–18)
CALCIUM SERPL-MCNC: 8.8 MG/DL (ref 8.5–10.1)
CHLORIDE SERPL-SCNC: 106 MMOL/L (ref 98–107)
CO2 SERPL-SCNC: 30.4 MMOL/L (ref 21–32)
CREAT SERPL-MCNC: 1.3 MG/DL (ref 0.6–1.3)
EOSINOPHIL NFR BLD: 5.1 % (ref 0–7)
ERYTHROCYTE [DISTWIDTH] IN BLOOD BY AUTOMATED COUNT: 14.8 % (ref 11.5–14.5)
GLUCOSE SERPL-MCNC: 121 MG/DL (ref 74–106)
HCT VFR BLD CALC: 39 % (ref 36–48)
HGB BLD-MCNC: 12.1 G/DL (ref 12–16)
IGE SERPL-ACNC: 25 IU/ML (ref 6–495)
IMM GRANULOCYTES NFR BLD: 0.3 % (ref 0–5)
LYMPHOCYTES NFR BLD AUTO: 20.6 % (ref 15–50)
MCH RBC QN AUTO: 28.7 PG (ref 26–34)
MCHC RBC AUTO-ENTMCNC: 31 G/DL (ref 31–37)
MCV RBC: 92.4 FL (ref 80–100)
MONOCYTES NFR BLD: 9 % (ref 2–11)
NEUTROPHILS NFR BLD AUTO: 64.8 % (ref 40–80)
OSMOLALITY SERPL CALC.SUM OF ELEC: 292 MOSM/KG (ref 275–300)
PLATELET # BLD: 217 10X3/UL (ref 130–400)
PMV BLD AUTO: 10.5 FL (ref 7.4–10.4)
POTASSIUM SERPL-SCNC: 3.6 MMOL/L (ref 3.5–5.1)
RBC # BLD AUTO: 4.22 10X6/UL (ref 4–5.4)
SODIUM SERPL-SCNC: 143 MMOL/L (ref 136–145)
WBC # BLD AUTO: 8.7 10X3/UL (ref 4.8–10.8)

## 2020-03-19 NOTE — NUR
OT NOTE: PT DOING VERY WELL TODAY. PT COMPLETED SIT TO STAND WITH SPV. PT
COMPLETED ADL MOB WITH SPV. PT COMPLETED KAMARI/DOFF SOCKS WITH SPV.PT COMPLETED
FACE HYGIENE WITH SET UP.
[phone]
THANK YOU,JIMBO GALLARDO

## 2020-03-19 NOTE — MORECARE
CASE MANAGEMENT DISCHARGE SUMMARY
 
 
PATIENT: JOSH GARCIA                     UNIT: [id number]
ACCOUNT#: [id number]                       ADM DATE: 20
AGE: 74     : 41  SEX: F            ROOM/BED: D.2113    
AUTHOR: SANDY BELL                             PHYSICIAN:                               
 
REFERRING PHYSICIAN: JORGE PEREZ MD               
DATE OF SERVICE: 20
Discharge Plan
 
 
Patient Name: JOSH GARCIA
[facility]
Encounter #: [id number]
Medical Record #: [id number]
: 1945
Planned Disposition: Home
Anticipated Discharge Date: 3/20/20
 
Discharge Date: 
Expected LOS: 4
Initial Reviewer: [email or web address]
Initial Review Date: 2020
Generated: 3/19/20   5:38 pm 
  
 
 
External Providers
[facility]
 
Next Contact Date: 2020
Service Request Date: 
Service Type: 
Resolution: 
 
Reviewer: 
Comments: 
 
 
 
 
 
 
Last DP export: 3/19/20   3:31 p
Patient Name: JOSH GARCIA
 
Encounter #: [id number]
Page [id number]
 
 
 
 
 
Electronically Signed by SANDY BELL on 20 at 1638
 
 
 
 
 
 
**All edits/amendments must be made on the electronic document**
 
DICTATION DATE: 20     : MARTHA  20     
RPT#: [id number]                                RI DATE:        
                                               STATUS: ADM IN  
[facility]
 [place] [address]
***END OF REPORT***

## 2020-03-19 NOTE — NUR
EVENING ROUNDS COMPLETED. VSS, AAOX4, NO S/S OF RT DISTRESS. 93% ON 3L HF NC.
BIPAP @ BEDSIDE. DAUGHTER @BEDSIDE. PT DENIES ANY FURTHER NEEDS AT THID TIME.
WILL CPOC. CL WITHIN REACH, BED IN LOW, SR UP X2.

## 2020-03-19 NOTE — MORECARE
CASE MANAGEMENT DISCHARGE SUMMARY
 
 
PATIENT: JOSH GARCIA                     UNIT: [id number]
ACCOUNT#: [id number]                       ADM DATE: 20
AGE: 74     : 41  SEX: F            ROOM/BED: D.7998    
AUTHOR: RAY,DOC                             PHYSICIAN:                               
 
REFERRING PHYSICIAN: JORGE PEREZ MD               
DATE OF SERVICE: 20
Discharge Plan
 
 
Patient Name: JOSH GARCIA
Facility: [facility]:[place]
Encounter #: [id number]
Medical Record #: [id number]
: 1945
Planned Disposition: Home
Anticipated Discharge Date: 3/20/20
 
Discharge Date: 
Expected LOS: 4
Initial Reviewer: [email or web address]
Initial Review Date: 2020
Generated: 3/19/20   5:48 pm 
Comments
 
DCP- Discharge Planning
 
Updated by [email or web address]: Luis Ansari on 3/19/20   3:47 pm CT
Patient Name: JOSH GARCIA                                     
Admission Status: ER   
Accout number: [id number]                              
Admission Date: 2020   
: 1945                                                        
Admission Diagnosis:   
Attending: JORGE PEREZ                                                
Current LOS:  3   
  
Anticipated DC Date: 2020   
Planned Disposition: Home   
Primary Insurance: MEDICARE A & B   
  
  
Discharge Planning Comments:   
CM RECEIVED ORDER FOR OXYGEN TESTING AND NEBULIZER WITH MEDICATIONS FOR 
NEBULIZER.  CM MET WITH PT IN ROOM TO DISCUSS DISCHARGE PLANNING AND NEEDS. 
PT REPORTS LIVING AT HOME INDEPENDENTLY AND ALONE; SHE PLANS TO HAVE HER 
DAUGHTER AND SON IN LAW STAY WITH HER FOR A FEW DAYS POST DISCHARGE TO ASSIST 
 
IF NEEDED. PT HAS BEDSIDE COMMODE AND WALKER WITH NO MEDICAL EQUIPMENT 
PROVIDER PREFERENCE.  PT HAS NO OUTSIDE SERVICES ASSISTING IN THE HOME. CM 
DISCUSSED AVAILABILITY OF HOME HEALTH, REHAB SERVICES AND MEDICAL EQUIPMENT. 
PT DENIES DISCHARGE NEEDS OTHER THAN OXYGEN AND NEBULIZER, SHE HAS NO 
PREFERENCE ON PROVIDER AFTER REVIEWING AVAILABLE PROVIDERS.  CHOICE SIGNED 
FOR NO PREFERENCE ON MEDICAL EQUIPMENT PROVIDER.  PT REPORTS HER DAUGHTER 
WILL PICK HER UP FOR DISCHARGE HOME. IMPORTANT MESSAGE FROM MEDICARE PROVIDED 
AND EXPLAINED.  
  
CM CALLED [facility], [phone], SPOKE TO ABDIAZIZ WHO TOOK OXYGEN, NEB AND NEB 
MEDICATION ORDER.  CM FAXED OXYGEN AND NEBULIZER ORDERS TO [facility] AT 
[phone]. [facility] TO ARRANGE PORTABLE OXYGEN TO HOPITAL ROOM AND HOME 
OXYGEN AND NEBULIZER FOR HOME DELIVERY AFTER PT ARRIVES HOME.  
  
  
: Luis Elan
 DCPIA - Discharge Planning Initial Assessment
 
Updated by [email or web address]: Luis Ansari on 3/19/20   4:43 pm
*  Is the patient Alert and Oriented?
Yes
*  How many steps to enter\exit or inside your home? NONE *  PCP DR. RAMÍREZ *  [facility] [place]
*  Preadmission Environment
Home Alone
*  ADLs
Independent
*  Equipment
Bedside Commode
Walker
*  Other Equipment
NO MEDICAL EQUIPMENT PROVIDER PREFERENCE
*  List name and contact numbers for known caregivers / representatives who 
currently or will assist patient after discharge:
KVNG MUNOZ, DTR, [phone]
*  Verbal permission to speak to the caregivers and representatives has been 
obtained from the patient.
N/A
*  Community resources currently utilized
None
*  Please name any agencies selected above.
NONE
*  Additional services required to return to the preadmission environment?
No
*  Can the patient safely return to the preadmission environment?
Yes
*  Has this patient been hospitalized within the prior 30 days at any 
hospital?
No
 
 
 
 
 
 
Coverage Notice
 
Reviewer: [email or web address] - Luis Ansari
 
Notice Issued Date-Time: 2020  14:45
Notice Type: IM Discharge Notice
 
Notice Delivered To: Patient
Relationship to Patient: 
Representative Name: 
 
Delivery Method: HAND - Hand Delivered
Ericka Days:
Prior Verbal Notification: 
 
Recipient Understood Notice: Yes
Recipient Signature: Yes
Med Rec Note Co-signed by Attending:
 
Coverage Notice Comment:  
 
Last DP export: 3/19/20   3:38 p
Patient Name: JOSH GARCIA
Encounter #: [id number]
Page [phone]
 
 
 
 
 
Electronically Signed by SANDY BELL on 20 at 1649
 
 
 
 
 
 
**All edits/amendments must be made on the electronic document**
 
DICTATION DATE: 20     : MARTHA  20     
RPT#: [id number]                                DC DATE:        
                                               STATUS: ADM IN  
[facility]
[address] [place] [address]
***END OF REPORT***

## 2020-03-19 NOTE — MORECARE
CASE MANAGEMENT DISCHARGE SUMMARY
 
 
PATIENT: JOSH GARCIA                     UNIT: [id number]
ACCOUNT#: [id number]                       ADM DATE: 20
AGE: 74     : 41  SEX: F            ROOM/BED: D.3    
AUTHOR: SANDY BELL                             PHYSICIAN:                               
 
REFERRING PHYSICIAN: JORGE PEREZ MD               
DATE OF SERVICE: 20
Discharge Plan
 
 
Patient Name: JOSH GARCIA
Facility: [facility]:[place]
Encounter #: [id number]
Medical Record #: [id number]
: 1945
Planned Disposition: Home
Anticipated Discharge Date: 3/20/20
 
Discharge Date: 
Expected LOS: 4
Initial Reviewer: [email or web address]
Initial Review Date: 2020
Generated: 3/19/20   5:30 pm 
  
 
 
 
 
 
 
 
Patient Name: JOSH GARCIA
 
Encounter #: [id number]
Page [phone]
 
 
 
 
 
Electronically Signed by SANDY BELL on 20 at 1631
 
 
 
 
 
 
**All edits/amendments must be made on the electronic document**
 
DICTATION DATE: 20 163     : MARTHA  20 1630     
RPT#: [id number]                                DC DATE:        
                                               STATUS: ADM IN  
[facility]
 [place] [address]
***END OF REPORT***

## 2020-03-20 VITALS — SYSTOLIC BLOOD PRESSURE: 128 MMHG | DIASTOLIC BLOOD PRESSURE: 67 MMHG

## 2020-03-20 VITALS — SYSTOLIC BLOOD PRESSURE: 120 MMHG | DIASTOLIC BLOOD PRESSURE: 51 MMHG

## 2020-03-20 VITALS — SYSTOLIC BLOOD PRESSURE: 114 MMHG | DIASTOLIC BLOOD PRESSURE: 54 MMHG

## 2020-03-20 LAB
ANION GAP SERPL CALC-SCNC: 12.6 MMOL/L (ref 8–16)
BASOPHILS NFR BLD AUTO: 0.3 % (ref 0–2)
BUN SERPL-MCNC: 29 MG/DL (ref 7–18)
CALCIUM SERPL-MCNC: 8.9 MG/DL (ref 8.5–10.1)
CHLORIDE SERPL-SCNC: 106 MMOL/L (ref 98–107)
CO2 SERPL-SCNC: 30.9 MMOL/L (ref 21–32)
CREAT SERPL-MCNC: 1.3 MG/DL (ref 0.6–1.3)
EOSINOPHIL NFR BLD: 5.2 % (ref 0–7)
ERYTHROCYTE [DISTWIDTH] IN BLOOD BY AUTOMATED COUNT: 14.4 % (ref 11.5–14.5)
GLUCOSE SERPL-MCNC: 125 MG/DL (ref 74–106)
HCT VFR BLD CALC: 39.7 % (ref 36–48)
HGB BLD-MCNC: 12.4 G/DL (ref 12–16)
IMM GRANULOCYTES NFR BLD: 0.2 % (ref 0–5)
LYMPHOCYTES NFR BLD AUTO: 24 % (ref 15–50)
MCH RBC QN AUTO: 28.6 PG (ref 26–34)
MCHC RBC AUTO-ENTMCNC: 31.2 G/DL (ref 31–37)
MCV RBC: 91.7 FL (ref 80–100)
MONOCYTES NFR BLD: 9.3 % (ref 2–11)
NEUTROPHILS NFR BLD AUTO: 61 % (ref 40–80)
OSMOLALITY SERPL CALC.SUM OF ELEC: 297 MOSM/KG (ref 275–300)
PLATELET # BLD: 219 10X3/UL (ref 130–400)
PMV BLD AUTO: 10.8 FL (ref 7.4–10.4)
POTASSIUM SERPL-SCNC: 3.5 MMOL/L (ref 3.5–5.1)
RBC # BLD AUTO: 4.33 10X6/UL (ref 4–5.4)
SODIUM SERPL-SCNC: 146 MMOL/L (ref 136–145)
WBC # BLD AUTO: 8.9 10X3/UL (ref 4.8–10.8)

## 2020-03-20 NOTE — NUR
OT NOTE: PT REPORTING FEELING BETTER;  HOPING TO GET TO GO HOME SOON; PT AMB
IN ROOM WITHOUT AD; PERFORMING ALL ADLS WITH SPV/INDEP. REMAINS ON 02 BUT NO
SOB NOTED DURING FUNCTIONAL TASKS. WILL DC PT ON THIS DATE. RECOMMEND PT
RETURN HOME WITH  SERVICES AS SHE LIVES ALONE.
CAMI PAVON, OTR/L
[phone]

## 2020-03-20 NOTE — NUR
RECEIVED PT IN BED AAOX4 RESP UNLABORED SKIN W/D COLOR WNL DENIES ANY NEEDS OR
PAIN AT THIS TIME NAD NOTED

## 2020-03-20 NOTE — MORECARE
CASE MANAGEMENT DISCHARGE SUMMARY
 
 
PATIENT: JOSH GARCIA                     UNIT: [id number]
ACCOUNT#: [id number]                       ADM DATE: 20
AGE: 74     : 41  SEX: F            ROOM/BED: D.8145    
AUTHOR: RAY,DOC                             PHYSICIAN:                               
 
REFERRING PHYSICIAN: JORGE PEREZ MD               
DATE OF SERVICE: 20
Discharge Plan
 
 
Patient Name: JOSH GARCIA
Facility: [facility]:[place]
Encounter #: [id number]
Medical Record #: [id number]
: 1945
Planned Disposition: Home
Anticipated Discharge Date: 3/20/20
 
Discharge Date: 
Expected LOS: 4
Initial Reviewer: [email or web address]
Initial Review Date: 2020
Generated: 3/20/20   2:12 pm 
Comments
 
DCP- Discharge Planning
 
Updated by [id number]: Luis Ansari on 3/19/20   3:47 pm CT
Patient Name: JOSH GARCIA                                     
Admission Status: ER   
Accout number: [id number]                              
Admission Date: 2020   
: 1945                                                        
Admission Diagnosis:   
Attending: JORGE PEREZ                                                
Current LOS:  3   
  
Anticipated DC Date: 2020   
Planned Disposition: Home   
Primary Insurance: MEDICARE A & B   
  
  
Discharge Planning Comments:   
CM RECEIVED ORDER FOR OXYGEN TESTING AND NEBULIZER WITH MEDICATIONS FOR 
NEBULIZER.  CM MET WITH PT IN ROOM TO DISCUSS DISCHARGE PLANNING AND NEEDS. 
PT REPORTS LIVING AT HOME INDEPENDENTLY AND ALONE; SHE PLANS TO HAVE HER 
DAUGHTER AND SON IN LAW STAY WITH HER FOR A FEW DAYS POST DISCHARGE TO ASSIST 
 
IF NEEDED. PT HAS BEDSIDE COMMODE AND WALKER WITH NO MEDICAL EQUIPMENT 
PROVIDER PREFERENCE.  PT HAS NO OUTSIDE SERVICES ASSISTING IN THE HOME. CM 
DISCUSSED AVAILABILITY OF HOME HEALTH, REHAB SERVICES AND MEDICAL EQUIPMENT. 
PT DENIES DISCHARGE NEEDS OTHER THAN OXYGEN AND NEBULIZER, SHE HAS NO 
PREFERENCE ON PROVIDER AFTER REVIEWING AVAILABLE PROVIDERS.  CHOICE SIGNED 
FOR NO PREFERENCE ON MEDICAL EQUIPMENT PROVIDER.  PT REPORTS HER DAUGHTER 
WILL PICK HER UP FOR DISCHARGE HOME. IMPORTANT MESSAGE FROM MEDICARE PROVIDED 
AND EXPLAINED.  
  
CM CALLED [facility], [phone], SPOKE TO ABDIAZIZ WHO TOOK OXYGEN, NEB AND NEB 
MEDICATION ORDER.  CM FAXED OXYGEN AND NEBULIZER ORDERS TO [facility] AT 
[phone]. [facility] TO ARRANGE PORTABLE OXYGEN TO HOPITAL ROOM AND HOME 
OXYGEN AND NEBULIZER FOR HOME DELIVERY AFTER PT ARRIVES HOME.  
  
  
: Luis Elan
 DCPIA - Discharge Planning Initial Assessment
 
Updated by [id number]: Luis Ansari on 3/19/20   4:43 pm
*  Is the patient Alert and Oriented?
Yes
*  How many steps to enter\exit or inside your home? NONE *  PCP DR. RAMÍREZ *  Pharmacy
[facility] IN [place]
*  Preadmission Environment
Home Alone
*  ADLs
Independent
*  Equipment
Bedside Commode
Walker
*  Other Equipment
NO MEDICAL EQUIPMENT PROVIDER PREFERENCE
*  List name and contact numbers for known caregivers / representatives who 
currently or will assist patient after discharge:
KVNG MUNOZ, DTR, [phone]
*  Verbal permission to speak to the caregivers and representatives has been 
obtained from the patient.
N/A
*  Community resources currently utilized
None
*  Please name any agencies selected above.
NONE
*  Additional services required to return to the preadmission environment?
No
*  Can the patient safely return to the preadmission environment?
Yes
*  Has this patient been hospitalized within the prior 30 days at any 
hospital?
No
 
 
 
 
 
 
Coverage Notice
 
Reviewer: [email or web address] Ansari
 
Notice Issued Date-Time: 2020  14:45
Notice Type: IM Discharge Notice
 
Notice Delivered To: Patient
Relationship to Patient: 
Representative Name: 
 
Delivery Method: HAND - Hand Delivered
Ericka Days:
Prior Verbal Notification: 
 
Recipient Understood Notice: Yes
Recipient Signature: Yes
Med Rec Note Co-signed by Attending:
 
Coverage Notice Comment:  
Reviewer: [email or web address] Ansari
 
Notice Issued Date-Time: 2020  14:45
Notice Type: Patient Choice Letter
 
Notice Delivered To: Patient
Relationship to Patient: 
Representative Name: 
 
Delivery Method: HAND - Hand Delivered
Ericka Days:
Prior Verbal Notification: 
 
Recipient Understood Notice: Yes
Recipient Signature: Yes
Med Rec Note Co-signed by Attending:
 
Coverage Notice Comment:  NO MEDICAL EQUIPMENT PROVIDER PREFERENCE
 
Last DP export: 3/19/20   3:48 p
Patient Name: JOSH GARCIA
 
Encounter #: [id number]
Page 21513
 
 
 
 
 
Electronically Signed by SANDY BELL on 20 at 1312
 
 
 
 
 
 
**All edits/amendments must be made on the electronic document**
 
DICTATION DATE: 20     : MARTHA  20     
RPT#: [id number]                                DC DATE:        
                                               STATUS: ADM IN  
[facility]
[address] [place] [address]
***END OF REPORT***

## 2020-03-20 NOTE — NUR
REVIEWED DISCHARGE INSTRUCTIONS WITH PT AND DAUGHTER BOTH STATE UNDERSTANDING
COPY GIVEN DCD SALINE LOCK TO RT HAND WITH IV CATHER INTACT SITE FREE OF
REDNESS OR EDEMA PT DISCHARGED HOME WITH ALL PERSONAL BELONGINGS LEFT UNIT IN
STABLE CONDITION VIA W/C

## 2020-04-14 ENCOUNTER — HOSPITAL ENCOUNTER (OUTPATIENT)
Dept: HOSPITAL 84 - D.CATH | Age: 75
Discharge: HOME | End: 2020-04-14
Attending: INTERNAL MEDICINE
Payer: MEDICARE

## 2020-04-14 VITALS — SYSTOLIC BLOOD PRESSURE: 154 MMHG | DIASTOLIC BLOOD PRESSURE: 58 MMHG

## 2020-04-14 VITALS — HEIGHT: 68 IN | BODY MASS INDEX: 39.49 KG/M2 | WEIGHT: 260.55 LBS

## 2020-04-14 DIAGNOSIS — I10: ICD-10-CM

## 2020-04-14 DIAGNOSIS — I25.10: ICD-10-CM

## 2020-04-14 DIAGNOSIS — I48.91: Primary | ICD-10-CM

## 2020-04-14 LAB
ALT SERPL-CCNC: 25 U/L (ref 10–68)
ANION GAP SERPL CALC-SCNC: 15 MMOL/L (ref 8–16)
BASOPHILS NFR BLD AUTO: 0.3 % (ref 0–2)
BUN SERPL-MCNC: 26 MG/DL (ref 7–18)
CALCIUM SERPL-MCNC: 8.8 MG/DL (ref 8.5–10.1)
CHLORIDE SERPL-SCNC: 109 MMOL/L (ref 98–107)
CHOLEST/HDLC SERPL: 4.8 RATIO (ref 2.3–4.1)
CO2 SERPL-SCNC: 22 MMOL/L (ref 21–32)
CREAT SERPL-MCNC: 1 MG/DL (ref 0.6–1.3)
EOSINOPHIL NFR BLD: 4.6 % (ref 0–7)
ERYTHROCYTE [DISTWIDTH] IN BLOOD BY AUTOMATED COUNT: 15 % (ref 11.5–14.5)
GLUCOSE SERPL-MCNC: 100 MG/DL (ref 74–106)
HCT VFR BLD CALC: 40.2 % (ref 36–48)
HDLC SERPL-MCNC: 44 MG/DL (ref 32–96)
HGB BLD-MCNC: 12.2 G/DL (ref 12–16)
IMM GRANULOCYTES NFR BLD: 0.3 % (ref 0–5)
LDL-HDL RATIO: 3.2 RATIO (ref 1.5–3.5)
LDLC SERPL-MCNC: 142 MG/DL (ref 0–100)
LYMPHOCYTES NFR BLD AUTO: 32.4 % (ref 15–50)
MCH RBC QN AUTO: 28 PG (ref 26–34)
MCHC RBC AUTO-ENTMCNC: 30.3 G/DL (ref 31–37)
MCV RBC: 92.2 FL (ref 80–100)
MONOCYTES NFR BLD: 7.2 % (ref 2–11)
NEUTROPHILS NFR BLD AUTO: 55.2 % (ref 40–80)
OSMOLALITY SERPL CALC.SUM OF ELEC: 287 MOSM/KG (ref 275–300)
PLATELET # BLD: 172 10X3/UL (ref 130–400)
PMV BLD AUTO: 11.3 FL (ref 7.4–10.4)
POTASSIUM SERPL-SCNC: 4 MMOL/L (ref 3.5–5.1)
RBC # BLD AUTO: 4.36 10X6/UL (ref 4–5.4)
SODIUM SERPL-SCNC: 142 MMOL/L (ref 136–145)
TRIGL SERPL-MCNC: 119 MG/DL (ref 30–200)
WBC # BLD AUTO: 6.8 10X3/UL (ref 4.8–10.8)

## 2020-04-14 NOTE — NUR
RIGHT GROIN DRESSING C/D/I. NO S/S OF HEMATOMA NOTED. CALL LIGHT
WITHIN REACH. VSS. NO NEEDS AT THIS TIME.

## 2020-04-14 NOTE — NUR
RIGHT GROIN DRESSING C/D/I. NO S/S OF HEMATOMA NOTED. CALL LIGHT
WITHIN REACH. VSS AT THIS TIME. PT RESTING COMFORTABLY.

## 2020-04-14 NOTE — NUR
RIGHT GROIN DRESSING C/D/I. NO S/S OF HEMATOMA NOTED. PIV D/C'D WITH
CATH TIP INTACT. TOLERATED WELL. DISCUSSED DISCHARGE INSTRUCTIONS
WITH PT. SHE VOICED UNDERSTANDING. PT INSTRUCTED TO GET UP AND
DRESSED AT THIS TIME. NO ASSISTANCE NEEDED.

## 2020-04-14 NOTE — NUR
RIGHT GROIN DRESSING C/D/I. NO S/S OF HEMATOMA NOTED. CALL LIGHT
WITHIN REACH. HEAD OF BED INC TO 30 DEGREES. TOLERATED WELL. SET UP
WITH SANDWICH TRAY AND DRINK. NO OTHER NEEDS AT THIS TIME.

## 2020-04-14 NOTE — NUR
RIGHT GROIN DRESSING C/D/I. NO S/S OF HEMATOMA NOTED. PT TAKEN DOWN
TO VEHICLE BY WHEELCHAIR. NO S/S OF DISTRESS NOTED. ALL BELONGINGS
AND PAPERWORK IN HAND. DISCUSSED DISCHARGE INSTRUCTIONS WITH PT'S
DAUGHTER. SHE VOICED UNDERSTANDING.

## 2020-04-15 NOTE — OP
PATIENT NAME:  JOSH GARCIA                           MEDICAL RECORD: [id number]
:41                                             LOCATION:D.CAT          
                                                         ADMISSION DATE:        
SURGEON:  SUNNY BARRY MD          
 
 
DATE OF OPERATION:  2020
 
PROCEDURE:  Left heart catheterization, selective coronary angiography, right
femoral artery approach.
 
CATHETERS:  A 5-Italian sheath, 5/4 left and right Ros, 5/4 pig.
 
The procedure was well tolerated.  The patient was returned to the almonte.  Sheath
removed.  ExoSeal device was placed.
 
FINDINGS:  Left ventriculography in 30-degree CASAS view:  Normal wall motion,
normal systolic function.
 
CORONARY ANATOMY:
LEFT MAIN:  Left main is free of disease.
LAD:  Area of previous stenting is widely patent.  No evidence of restenosis. 
No progression of native disease.
CIRCUMFLEX:  Small circumflex, free of disease.
RIGHT CORONARY ARTERY:  Dominant artery, gives rises to PDA, free of disease.
 
IMPRESSION:  No evidence of restenosis.  No progression of native disease.  LV
function remains normal.
 
TRANSINT:[id number] Voice Confirmation ID: [id number] DOCUMENT ID: [id number]
                                           
                                           SUNNY BARRY MD          
 
 
 
Electronically Signed by SUNNY BARRY on 04/15/20 at 0908
 
 
 
 
 
 
 
 
 
 
 
 
 
 
CC:                                                             [id number]
DICTATION DATE: 20 1407     :     20 1455      DEP CLI 
                                                                      20
[address] [id number] [place] [address]

## 2024-06-04 NOTE — NUR
Intensive Care Daily Quality Rounding Checklist      ICU Team Members: Dr. Jung, residents, charge nurse, bedside nurse, clinical pharmacist and respiratory therapy    ICU Day #: 1    Intubation Date: N/A    Ventilator Day #: N/A    Central Line Insertion Date: R single lumen subclavian present on admission         Day #:        Indication: receiving vasopressors      Arterial Line Insertion Date: N/A      Day #: N/A    Temporary Hemodialysis Catheter Insertion Date: N/A      Day # N/A    DVT Prophylaxis:    GI Prophylaxis:    Oates Catheter Insertion Date: N/A       Day #: N/A    Indications: N/A      Continued need (if yes, reason documented and discussed with physician): N/A    Skin Issues/ Wounds and ordered treatment discussed on rounds: N    Goals/ Plans for the Day: Wean vasopressors. Trend labs and replace as indicated. Consult Hematology/oncology and GI. Transfer patient to IM. Order Heparin sub-Q     Reviewed plan and goals for day with patient and/or representative:      TO ROOM 39 VIA STRETCHER AND SOON TO BIPAP IV SL TO RT WRIST BED LOW AND
LOCKED CALL LIGHT GIVEN TO PT AND TELE APPLIED

## 2024-10-23 NOTE — HEMODYNAMI
PATIENT:JOSH GARCIA                                  MEDICAL RECORD: [id number]
: 41                                            LOCATION:D.CAT          
ACCT# [id number]                                       ADMISSION DATE: 20
 
 
 Generatedon:202013:59
Patient name: JOSH GARCIA Patient #: [id number] Visit #: [id number] SSN: 4
[id number] :
1945 Date of study: 2020
Page: Of
Hemodynamic Procedure Report
****************************
Patient Data
Patient Demographics
First Name: JOSH       Gender: Female
Last Name: RADHA            : 1945
Patient #: [id number]       Age: 74 year(s)
Visit #: [id number]       Race: 
SSN: [id number]              Ethnicity:  or
Accession #:                
[id number]
Additional ID: [id number]
Contact details
Address: [address] [place] [address]        Phone: [phone]
State: [place]
City: [place]
Zip code: [address]
Admission
Admission Data
Admission Date: 2020   Admission Time: 11:29
Arrival Date: 2020     Arrival Time: 0:00
Admit Source: Other
Lab Results
Lab Result Date: 2020  Lab Result Time: 0:00
Biochemistry
Name         Units    Result                Min      Max
BUN          mg/dl    26       --(----)-*   7        18
Creatinine   mg/dl    1        --(--*-)--   0.6      1.3
eGFR         ml/min   57       *-(----)--   90       120
NONAFRICAN
CBC
Name         Units    Result                Min      Max
Hemoglobin   g/dl     12.2     *-(----)--   13.5     17.5
Procedure
Procedure Types
Cath Procedure
Diagnostic Procedure
LHC
LH w/Coronaries
Sedation Charges
Moderate Sedation up to 15 minutes
Procedure Description
Procedure Date
Procedure Date: 2020
Procedure Start Time: 13:48
Procedure End Time: 13:55
Procedure Staff
Name                            Function
Tony Harp MD              Performing Physician
 
Wendi Lopez RT                    Monitor
Abena Ledezma RT                Scrub
Lanny Pitt RN                Nurse
Procedure Data
Cath Procedure
Fluoroscopy
Diagnostic fluoroscopy      Total fluoroscopy Time: 1.5
time: 1.5 min               min
Diagnostic fluoroscopy      Total fluoroscopy dose: 391
dose: 391 mGy               mGy
Contrast Material
Contrast Material Type                       Amount (ml)
Isovue 300                                   63
Entry Location
Entry     Primary  Successful  Side  Size  Upsize Upsize Entry    Closure Succes
sful  Closure
Location                             (Fr)  1 (Fr) 2 (Fr) Remarks  Device        
      Remarks
Femoral                        Right 5 Fr                         Exoseal
artery
Estimated blood loss: 5 ml
Diagnostic catheters
Device Type               Used For           End Catheter
Placement
MULTIPACK JL 4.0 5Fr      Left Coronary
catheter                  Angiography
MULTIPACK 3DRC 5Fr        Right Coronary
catheter                  Angiography
MULTIPACK Pigtail 5 Fr    LV Angiography
catheter
Procedure Complications
No complications
Procedure Medications
Medication           Administration Route Dosage
0.9% NaCl            I.V.                 100 ml/hr
Oxygen               etCO2 Nasal cannula  2 l/min
Lidocaine 2%         added to field       20
Heparin Flush Bag    added to field       2 bags
(1000units/500ml NS)
Versed               I.V.                 2 mg
Fentanyl             I.V.                 50 mcg
Hemodynamics
Rest
HGB: 12.2 (g/dl) Heart Rate: 77 (bpm)
Pressure Samples
Time     Site     Value (mmHg) Purpose      Heart      Use
Rate(bpm)
13:53    LV       200/18,30    Snapshot     87
Gradients
Valve  Time  Site Site Mean    SEP/DFP    Peak To Heart  Use
1    2    (mmHg)  (sec/min)  Peak    Rate
(mmHg)  (bpm)
Aortic 13:54 LV   AO                              57
Snapshots
Pre Cath      Intra         NCS           Post Cath
Vital Signs
Time     Heart  Resp   SPO2 etCO2   NIBP (mmHg) Rhythm  Pain    Sedation
Rate   (ipm)  (%)  (mmHg)                      Status  Level
(bpm)
13:35:59 77     18     98   32.5    Measuring   NSR     0 (11)  10(A)
 
, No
pain
13:37:08 74     19     97   31.7    188/85(144) NSR     0 (11)  10(A)
, No
pain
13:42:03 75     20     96   31.7    195/83(148) NSR     0 (11)  10(A)
, No
pain
13:47:00 76     21     98   32.5    197/84(144) NSR     0 (11)  10(A)
, No
pain
13:51:57 66     15     98   31.7    194/93(143) NSR     0 (11)  10(A)
, No
pain
13:56:50 80     23     91   28.7    214/98(148) NSR     0 (11)  10(A)
, No
pain
Medications
Time     Medication       Route   Dose  Verified Delivered Reason     Notes  Eff
ectiveness
by       by
13:37:29 0.9% NaCl        I.V.    100   Tony  Lanny     used for
ml/hr [place]  Yoandy   procedure
MD       RN
13:37:36 Oxygen           etCO2   2     Tony  Lanny     used for
Nasal   l/min [facility]   procedure
cannula       MD       RN
13:37:42 Lidocaine 2%     added   20ml  Tony Jimenez   for local
to      vial  [facility]   anesthetic
field         MD       MD
13:37:49 Heparin Flush    added   2     Tony  Tony   used for
Bag              to      bags  [facility]   procedure
(1000units/500ml field         MD       MD
NS)
13:38:05 Versed           I.V.    2 mg  Tony  Lanny     for
[facility]   sedation
MD       RN
13:38:10 Fentanyl         I.V.    50    Tony  Lanyn     for
mcg   [place]  Yoandy   sedation
MD       RN
Procedure Log
Time     Note
13:16:49 Diagnostic Cath Status : Elective
13:17:23 Admit Source: Other
13:17:25 Arrival Date: 2020 12:00:00 AM
13:20:29 Abena Ledezma RT(R) sent for patient. Start room use.
13:29:22 Lab Result : Hemoglobin 12.2 g/dl
13:29:22 Lab Result : eGFR NONAFRICAN 57 ml/min
13:29:22 Lab Result : BUN 26 mg/dl
13:29:22 Lab Result : Creatinine 1 mg/dl
13:29:27 Procedure Status Elective Heart Cath (OP).
13:29:41 Time tracking: Regular hours (M-F 7:00 - 5:00)
13:29:46 Plan of Care:Hemodynamics will remain stable., Cardiac
rhythm will remain stable., Comfort level will be
maintained., Respiratory function will remain
adequate., Patient/ family verbilizes understanding of
procedure., Procedure tolerated without complication.,
Recovers from procedure without complications..
13:29:53 Patient received from Pre/Post Procedure Room to [facility] 2
Alert and oriented. Tansferred to table in Supine
 
position.
13:30:00 Warm blankets applied, and berkley hugger turned on for
patient comfort.
13:30:07 Correct patient and procedure confirmed by team.
13:30:08 ECG and BP/O2 sat monitors applied to patient.
13:30:09 Vital chart was started
13:35:23 Baseline sample Acquired.
13:35:32 Rhythm: sinus tachycardia
13:35:34 Full Disclosure recording started
13:35:38 H&P Date Dictated: 2020 Within 30 days and on
chart., H&P Addendum completed by physician on day of
procedure. (MUST COMPLETE FOR ALL OUTPATIENTS).
13:35:39 Pre-procedure instructions explained to patient.
13:35:40 Pre-op teaching completed and patient verbalized
understanding.
13:35:43 Family unavailable.
13:35:45 Patient NPO since Midnight.
13:35:48 Is the patient allergic to Iodine/contrast media? No.
13:35:49 Was the patient premedicated? Yes
13:35:50 Is patient on blood thinner?Yes
13:36:17 patient states last dose of xarelto on 2020
13:36:21 Patient diabetic? No.
13:36:32 Previous problem with sedation/anesthesia? No ?
13:36:34 Snore? Yes
13:36:43 Sleep apnea? Yes
13:36:45 Deviated septum? No
13:36:46 Opens mouth fully? Yes
13:36:46 Sticks out tongue? Yes
13:36:55 Airway obstruction? Yes asmtha
13:36:59 Dentures? Yes in tight
13:37:02 Pre procedure: right dorsailis pedis pulse 2+ Normal;
easily identifiable; not easily obliterated
13:37:05 Pre procedure: left dorsailis pedis pulse 2+ Normal;
easily identifiable; not easily obliterated
13:37:07 Patient pain scale 0/10 ?.
13:37:13 IV patent on arrival in right forearm with 0.9% NaCl
at [facility].
13:37:15 Lab results completed and on chart.
13:37:20 Risk of Mortality: 0.1
13:37:23 Risk of blood transfusion: 0.8
13:37:27 Risk of EDWARD: 3.6
13:37:29 0.9% NaCl 100 ml/hr I.V. was administered by Lanny Pitt RN; used for procedure; Verbal order read back
and verified.
13:37:33 Right groin area was prepped with chlora-prep and
draped in sterile fashion
13:37:33 Alarms reviewed by R. N.
13:37:34 Sharps counted by scrub and verified by R.N.
13:37:35 Physician arrived
13:37:36 Oxygen 2 l/min etCO2 Nasal cannula was administered by
Lanny Pitt RN; used for procedure; Verbal order
read back and verified.
13:37:36 --------ALL STOP TIME OUT------
13:37:41 Final Timeout: patient, procedure, and site verified
with staff and physician. All members of the team are
in agreement.
13:37:42 Lidocaine 2% 20ml vial added to field was administered
by Tony Harp MD; for local anesthetic; Verbal
order read back and verified.
13:37:47 Right groin site verified by team.
 
13:37:49 Heparin Flush Bag (1000units/500ml NS) 2 bags added to
field was administered by Tony Harp MD; used for
procedure; Verbal order read back and verified.
13:37:51 Fire Safety Assessment: A--An alcohol-based skin
anteseptic being used preoperatively., C--Open oxygen
or nitrous oxide is being used., D--An ESU, laser, or
fiber-optic light is being used.
13:37:54 Physical assessment completed. ASA score P 2 - A
patient with mild systemic disease as per Tony Harp MD.
13:38:05 Versed 2 mg I.V. was administered by Lanny Pitt RN;
for sedation; Verbal order read back and verified.
13:38:10 Fentanyl 50 mcg I.V. was administered by Lanny Pitt RN; for sedation; Verbal order read back and verified.
13:40:07 3a) 45-59 Moderately reduced kidney function.
13:41:24 Maximum allowable contrast dose (3.7 X eGFR X 0.75)158
ml.
13:41:28 Sedation plan: IV Moderate Sedation Medication:Versed,
Fentanyl
13:41:32 Use device set Femoral Dx
13:41:33 ACIST Syringe (50738) opened to sterile field.
13:41:33 Bag Decanter () opened to sterile field.
13:41:33 Medline Cath Pack ([id number]) opened to sterile field.
13:41:35 ACIST Hand Control (29044) opened to sterile field.
13:41:35 ACIST Manifold (56614) opened to sterile field.
13:41:35 DIAGNOSTIC Multipack 5Fr catheter set ([id number]) opened
to sterile field.
13:41:36 Tegaderm 4 x 4 (1626W) opened to sterile field.
13:41:37 SHEATH 5FR [place] (YTP994) opened to sterile field.
13:41:38 EMERALD Guide Wire (301-437) opened to sterile field.
13:45:50 Zero performed for pressure channel P1
13:48:32 Procedure started.
13:48:35 Local anesthetic to right femoral artery with
Lidocaine 2% by Tony Harp MD.**INITIAL ACCESS
ONLY**
13:48:44 A 5 Fr sheath was inserted into the Right Femoral
artery
13:49:00 A MULTIPACK JL 4.0 5Fr catheter was advanced over the
wire and used for Left Coronary Angiography.
13:51:02 LCA angiography performed.
13:51:05 Injector settings: Ml/sec: 3, Volume: 6,
13:51:39 Catheter removed.
13:51:45 A MULTIPACK 3DRC 5Fr catheter was advanced over the
wire and used for Right Coronary Angiography.
13:51:59 RCA angiography performed.
13:52:03 Injector settings: Ml/sec: 3, Volume: 6+,
13:52:25 Catheter removed.
13:52:34 A MULTIPACK Pigtail 5 Fr catheter was advanced over
the wire and used for LV Angiography.
13:52:47 **ACC**Dominant side:Right
13:53:41 LV hemodynamics recorded.
13:53:42 LV gram done using CASAS
13:53:44 Injector settings: Ml/sec: 5, Volume: 15,
13:53:55 EF : 55 %
13:53:57 Catheter removed.
13:53:59 EXOSEAL 5Fr () opened to sterile field.
13:54:08 Sheath removed intact; hemostasis achieved with
Exoseal to the Right Femoral artery.
13:54:10 Procedure ended.(Physican Out)
13:54:48 Fluoroscopy time 01.50 minutes.
 
13:54:52 Fluoroscopy dose: 391 mGy
13:54:52 Flurop Dose total: 391
13:54:57 Dose Area Product 97112 mGy/cm.
13:55:02 Contrast amount:Isovue 300 63ml.
13:55:04 Maximum allowable dose exceeded? No.
13:55:05 Sharps counted by scrub and verified by R.N.
13:55:06 Insertion/operative site no bleeding no hematoma.
13:55:10 Post-op/insertion site Right Femoral artery dressed
using a 4 x 4 and Tegaderm.
13:55:11 Post Procedure Pulses reassessed and unchanged
13:55:15 Post procedure rhythm: unchanged.
13:55:18 Estimated blood loss: 5 ml
13:55:20 Post procedure instruction explained to
patient.Patient verbalizes understanding.
13:55:20 Patient needs reinforcement of post procedure
teaching.
13:55:35 Procedure type changed to Cath procedure, Diagnostic
procedure, LHC, LHC w/Coronaries, Sedation Charges,
Moderate Sedation up to 15 minutes
13:55:37 Procedure and supply charges have been captured,
reviewed, submitted and are correct.
13:55:41 Procedure Complication : No complications
13:55:48 LH Findings: mild to moderate CAD (<70%)
13:55:49 Vital chart was stopped
13:55:51 Operative report dictated upon procedure completion.
13:55:52 See physician's report for complete and final results.
13:55:53 Report given to Pre/Post Procedure Room.
13:55:56 Patient transfered to Pre/Post Procedure Room with
Stretcher.
13:55:58 Procedure ended.
13:55:58 Full Disclosure recording stopped
13:56:03 End room use (Document Last)
Device Usage
Item Name   Manufacture  Quantity  Catalog    Hospital Part    Current Minimal L
ot# /
Number     Charge   Number  Stock   Stock   Serial#
Code
ACIST       Acist        1         47642      531937   234664  [id number]  20
Syringe     Medical
(43078)     Systems Inc
Bag         Microtek     1         S      191144   13744   256682  5
Decanter    Medical Inc.
()
Medline     Medline      1         [id number]  573740   13908   398235  5
Cath Pack
([id number])
ACIST Hand  Acist        1         26191      195997   617328  974500  5
Control     Medical
(36258)     Systems Inc
ACIST       Acist        1         50456      712257   033809  026338  5
Manifold    Medical
(53293)     Systems Inc
DIAGNOSTIC  Cardinal     1         [id number]     755262   94094   101702  30
Multipack   Health
5Fr
catheter
set
(OL0495)
Tegaderm 4  3M           1         1626W      488987   680675  003023  5
x 4 (1626W)
 
SHEATH 5FR  Terumo       1         [id number]     246471   649821  179892  5
[place]
(SLN853)
EMERALD     Cardinal     1         502-455    692093   359301  151872  5
Guide Wire  Health
(967-596)
MULTIPACK   Cardinal     1                                     651563  5
JL 4.0 5Fr  Health
catheter
MULTIPACK   Cardinal     1                                     928094  5
3DRC 5Fr    Health
catheter
MULTIPACK   Cardinal     1                                     945019  5
Pigtail 5   Health
Fr catheter
EXOSEAL 5Fr Cardinal     1               043081   547076  856549  10
()     Health
Signature Audit [place]
Stage           Time        Signature      Unsigned
Intra-Procedure 2020   Wendi Lopez
1:58:35 PM  RT(R)
Intra-Procedure 2020   Lanny Pitt
1:58:53 PM  RN
Intra-Procedure 2020   Tony Puri
1:59:14 PM  Pablo CAPPS
 
 
 
 
 
 
 
 
 
 
 
 
 
 
 
 
 
 
 
 
 
 
 
 
 
 
 
 
 
 
[facility]                                          
[address] [place] [address]
Yes